# Patient Record
Sex: MALE | Race: WHITE | Employment: STUDENT | URBAN - METROPOLITAN AREA
[De-identification: names, ages, dates, MRNs, and addresses within clinical notes are randomized per-mention and may not be internally consistent; named-entity substitution may affect disease eponyms.]

---

## 2019-03-27 ENCOUNTER — OFFICE VISIT (OUTPATIENT)
Dept: ORTHOPEDIC SURGERY | Age: 19
End: 2019-03-27
Payer: COMMERCIAL

## 2019-03-27 VITALS
SYSTOLIC BLOOD PRESSURE: 121 MMHG | WEIGHT: 167.33 LBS | HEIGHT: 63 IN | HEART RATE: 71 BPM | DIASTOLIC BLOOD PRESSURE: 82 MMHG | BODY MASS INDEX: 29.65 KG/M2

## 2019-03-27 DIAGNOSIS — M25.531 WRIST PAIN, RIGHT: Primary | ICD-10-CM

## 2019-03-27 DIAGNOSIS — M77.8 TENDINITIS OF RIGHT WRIST: ICD-10-CM

## 2019-03-27 PROCEDURE — L3908 WHO COCK-UP NONMOLDE PRE OTS: HCPCS | Performed by: PHYSICIAN ASSISTANT

## 2019-03-27 PROCEDURE — 99203 OFFICE O/P NEW LOW 30 MIN: CPT | Performed by: PHYSICIAN ASSISTANT

## 2019-04-17 ENCOUNTER — OFFICE VISIT (OUTPATIENT)
Dept: ORTHOPEDIC SURGERY | Age: 19
End: 2019-04-17
Payer: COMMERCIAL

## 2019-04-17 VITALS — WEIGHT: 167.33 LBS | BODY MASS INDEX: 29.65 KG/M2 | HEIGHT: 63 IN

## 2019-04-17 DIAGNOSIS — M25.531 WRIST PAIN, RIGHT: Primary | ICD-10-CM

## 2019-04-17 PROCEDURE — 99203 OFFICE O/P NEW LOW 30 MIN: CPT | Performed by: ORTHOPAEDIC SURGERY

## 2019-04-17 NOTE — PROGRESS NOTES
CC:  Ulnar right wrist pain    HISTORY OF PRESENT ILLNESS:    Ivania Almaraz is a  right-hand-dominant patient here for a pain in the Right wrist that began approximately 7 weeks ago. There was not an associated injury-- however, he is very avid in the gym working out. Patient's pain located in the Ulnar aspect of the wrist, and patient denies tenderness of the remaining fingers, hand, or elbow. Pain exacerbated with wrist flexion: mild, extension: mild, rotation: moderate, lateral bending: moderate and alleviated with rest. Patient denies numbness, tingling or weakness in wrist/hand. Symptoms have been intermittent. Previous treatment:  rest      A specialty hand and upper extremity consultation was requested  By my colleague TESS Jensen for evaluation and treatment of left wrist pain    Medical History:  Past Medical History:   Diagnosis Date    Allergic rhinitis     Asthma      No past surgical history on file. No family history on file.   Social History     Socioeconomic History    Marital status: Single     Spouse name: None    Number of children: None    Years of education: None    Highest education level: None   Occupational History    None   Social Needs    Financial resource strain: None    Food insecurity:     Worry: None     Inability: None    Transportation needs:     Medical: None     Non-medical: None   Tobacco Use    Smoking status: Never Smoker    Smokeless tobacco: Never Used   Substance and Sexual Activity    Alcohol use: None    Drug use: None    Sexual activity: None   Lifestyle    Physical activity:     Days per week: None     Minutes per session: None    Stress: None   Relationships    Social connections:     Talks on phone: None     Gets together: None     Attends [employer] service: None     Active member of club or organization: None     Attends meetings of clubs or organizations: None     Relationship status: None    Intimate partner violence:     Fear of current or ex partner: None     Emotionally abused: None     Physically abused: None     Forced sexual activity: None   Other Topics Concern    None   Social History Narrative    None     No current outpatient medications on file. No current facility-administered medications for this visit. No Known Allergies    ROS:  ROS neg except for positives in HPI    PHYSICAL EXAMINATION:    Gen/Psych: Examination reveals a pleasant individual in no acute distress. The patient is oriented to time, place and person. The patient's mood and affect are appropriate. Lymph: The lymphatic examination bilaterally reveals all areas to be without enlargement or induration. Skin intact without lymphadenopathy, discoloration, or abnormal temperature. Vascular: There is intact, symmetric circulation in both upper extremities. Musculoskeletal       Cervical spine, shoulders, elbows, digits:    satisfactory pain-free range of motion,                                                                           strength, and stability       Left wrist:  Pain to palpation in the Ulnar wrist  Swelling is not present. Strength and ROM limited by pain. No pain or swelling and full ROM of other digits/hand  There is not clinical evidence of skeletal deformity or mal-rotation. no palpable cyst.  No ECU subluxation. No DRUJ instability. TFCC compression testing causes some discomfort and a palpable click        contralateral wrist : normal ROM without pain. No pain on palpation. No swelling. Normal strength. Neurological:  Essentially baseline normal     DIAGNOSTIC TESTING:     X-rays reviewed in office:  Views 3  Location right wrist  Impression Multiple views of affected wrist demonstrated satisfactory articular congruity with absence of fracture.   Neutral ulnar variance    IMPRESSION AND PLAN:  Right wrist pain, likely TFCC irritation or injury  Wrist pain consistent with likely Right wrist soft tissue sprain. We discussed the natural course of wrist sprains and appropriate follow-up plans to insure interval healing and improvement. Patient is in a try a range of motion and stretching program in the morning along with workout modifications, avoiding twisting motions of his wrist with heavy weights. In addition, I recommended a wrist widget, he can obtain this brace on line. We provided our contact information. If he is not improving over the next several weeks, he will contact us and we will move forward with MRI of the right wrist to rule out TFCC tear    He is hopeful to avoid surgery at this point. We appreciate the patient referral    All questions and concerns were addressed today. Patient is in agreement with the plan. Mariella Joyce MD  Hand & Upper Extremity Surgery  [address]  A partner of [facility] ([facility])        Please note that this transcription was created using voice recognition software. Any errors are unintentional and may be due to voice recognition transcription.